# Patient Record
Sex: MALE | Race: BLACK OR AFRICAN AMERICAN | NOT HISPANIC OR LATINO | Employment: UNEMPLOYED | URBAN - METROPOLITAN AREA
[De-identification: names, ages, dates, MRNs, and addresses within clinical notes are randomized per-mention and may not be internally consistent; named-entity substitution may affect disease eponyms.]

---

## 2018-01-13 NOTE — MISCELLANEOUS
Message  reviewed patient CBC and shows increased platelet count, hg fairly stable  Talked to patient s son on the phone and explained the lab work results, also that I would like the patient to be seen by hematology for consultation, the son understood and agreed with the plan, patient feels good, at baseline, he is on Iron replacement therapy and has black stools, but no dizziness or new sx  I called hematology s office and made an appointment for him on March 7 th at 1:30 pm with Dr Jessenia Mercado  patient`s son is aware of the appointment and will go for it        Signatures   Electronically signed by : Lenora Navarro MD; Feb 19 2016 11:17AM EST                       (Author)

## 2018-01-18 NOTE — MISCELLANEOUS
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   (453 52) (I82 5Z9)   8  Colon cancer screening (V76 51) (Z12 11)   9  Constipation (564 00) (K59 00)   10  Dementia without behavioral disturbance (294 20) (F03 90)   11  Diverticulum of bladder (596 3) (N32 3)   12  Encounter for urinary catheterization (V53 6) (Z46 6)   13  Hematoma (924 9) (T14 8)   14  History of BPH (V13 89) (Z87 438)   15  Hydronephrosis (591) (N13 30)   16  Hyperlipidemia (272 4) (E78 5)   17  Hypertension (401 9) (I10)   18  Need for Tdap vaccination (V06 1) (Z23)   19  Need for vaccination for pneumococcus (V03 82) (Z23)   20  Need for Zostavax administration (V04 89) (Z23)   21  Neurogenic bladder (596 54) (N31 9)   22  Pelvic fracture (808 8) (S32 9XXA)   23  Presence of permanent cardiac pacemaker (V45 01) (Z95 0)   24  Refusal of blood transfusions as patient is Alevism (V62 6) (Z53 1)   25  Retroperitoneal hematoma (459 0) (K66 1)   26  Screening for glaucoma (V80 1) (Z13 5)   27  Sinus bradycardia (427 89) (R00 1)   28  Spinous process fracture (805 8)   29  Suprapubic catheter (V44 59) (Z93 59)   30  Thrombocytosis (238 71) (D47 3)   31  Urinary retention (788 20) (R33 9)   32  Urinary tract infection (599 0) (N39 0)    Current Meds   1  Acetaminophen 325 MG Oral Tablet; TAKE 1 TO 2 TABLETS EVERY 6 HOURS AS   NEEDED; Therapy: (Recorded:03Mar2015) to Recorded   2   Acetaminophen 650 MG Rectal Suppository; INSERT 1 SUPPOSITORY RECTALLY EVERY 4 TO 6 HOURS AS NEEDED; Therapy: (Recorded:18Mar2015) to Recorded   3  Amiodarone HCl - 100 MG Oral Tablet; TAKE 1 TABLET DAILY; Therapy: 71ISI3833 to (Marques Broussard)  Requested for: 10BJC2969; Last   Rx:07Jan2016 Ordered   4  Aspirin 81 MG Oral Tablet; TAKE 1 TABLET DAILY; Therapy: 98Nlw1562 to (Evaluate:43Kts9137)  Requested for: 22WGG8550; Last   UU:86SHP5879 Ordered   5  Colace 100 MG Oral Capsule; TAKE 1 CAPSULE TWICE DAILY; Therapy: (Recorded:18Mar2015) to Recorded   6  Donepezil HCl - 10 MG Oral Tablet; TAKE 1 TABLET DAILY; Therapy: 85Jaz3000 to (Evaluate:13Xum9533)  Requested for: 60NLO8167; Last   Rx:75Uom4743 Ordered   7  Dulcolax Stool Softener 100 MG Oral Capsule; TAKE 1 CAPSULE TWICE DAILY AS   NEEDED; Therapy: 93Uuj7929 to (Evaluate:03Rti4456)  Requested for: 73Ife6936; Last   Rx:11Aug2015 Ordered   8  Finasteride 5 MG Oral Tablet; take 1 tablet by mouth once daily; Therapy: 57Tms2077 to (Evaluate:18Dlt2171)  Requested for: 06Oen4755; Last   Rx:76Sng4884 Ordered   9  Furosemide 40 MG Oral Tablet; TAKE 1/2 TABLET DAILY; Therapy: 99AQK3302 to (Evaluate:51Ldc0852)  Requested for: 41Tij7483; Last   Rx:74Rhi5736 Ordered   10  Metoprolol Tartrate 25 MG Oral Tablet; TAKE 1/2 TABLET EVERY 12 HOURS; Therapy: 72Oqd3504 to (Evette Ibarra)  Requested for: 54CJV7692; Last    Rx:07Jan2016 Ordered   6  Milk of Magnesia 400 MG/5ML Oral Suspension; TAKE ML  PRN; Therapy: (Recorded:18Mar2015) to Recorded   12  MiraLax Oral Powder (Polyethylene Glycol 3350); TAKE 17 GM Daily 1 camful equals 17    gm; Therapy: (Recorded:18Mar2015) to Recorded   13  Pravastatin Sodium 40 MG Oral Tablet (Pravachol); TAKE 1 TABLET DAILY AT BEDTIME; Therapy: 69ZNA6167 to (Evins Sabal)  Requested for: 59VDA0093; Last    Rx:07Jan2016 Ordered   15  Senna Concentrate 8 6 MG TABS; TAKE AS DIRECTED; Therapy: (830 513 988) to Recorded   15  Zoster (Zostavax) (Zoster (Zostavax));  INJECT 0 65 ML Once; Therapy: 11Aug2015 to (Evaluate:91Lay2719)  Requested for: 11Aug2015; Last    Rx:11Aug2015 Ordered    Allergies    1  No Known Drug Allergies    2  No Known Environmental Allergies   3  No Known Food Allergies    Signatures   Electronically signed by :  Greg Li, ; Mar  7 2016  3:50PM EST                       (Author)

## 2018-03-07 NOTE — PROGRESS NOTES
"  Discussion/Summary  Normal device function      Results/Data  Cardiac Device In Clinic [id number] 06:02PM Marceil Hazard     Test Name Result Flag Reference   MISCELLANEOUS COMMENT (Report)     DEVICE INTERROGATED IN THE [place] OFFICE: BATTERY VOLTAGE ADEQUATE  AP 94 2%  87 3%  164 AHRS NOTED, LONGEST >6 HOURS  AVAIL EGRAMS APPEAR AFIB AT TIMES WITH RVR  PT ON AMIO, ASA, & METO TART  ALL LEAD PARAMETERS WITHIN NORMAL LIMITS  NORMAL DEVICE FUNCTION  [place]   Cardiac Electrophysiology Report      [id number]  pdf   DEVICE TYPE Pacemaker       Cardiac Electrophysiology Report [id number] 06:02PM Marceil Hazard     Test Name Result Flag Reference   Cardiac Electrophysiology Report      [id number]  pdf     Signatures   Electronically signed by : Lorie Mccormick, ; Sep 19 2016  2:12PM EST                       (Author)    Electronically signed by : Emilie Fierro DO; Sep 24 2016  7:39AM EST                       (Author)    "

## 2018-08-22 ENCOUNTER — TELEPHONE (OUTPATIENT)
Dept: INTERNAL MEDICINE CLINIC | Facility: CLINIC | Age: 83
End: 2018-08-22